# Patient Record
(demographics unavailable — no encounter records)

---

## 2024-10-15 NOTE — ASSESSMENT
[FreeTextEntry1] : Young woman in good health. Check labs when fasting. Flu shot next wk will check on Tdap, can return for RN visit if needed.

## 2024-10-15 NOTE — HISTORY OF PRESENT ILLNESS
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 Monog in hetero relationship.

## 2024-10-15 NOTE — HEALTH RISK ASSESSMENT
[Yes] : Yes [Never] : Never [HIV Test offered] : HIV Test offered [Hepatitis C test offered] : Hepatitis C test offered [Good] : ~his/her~  mood as  good [de-identified] : occas [de-identified] : a lot [de-identified] : no longer pescetarian, now eating chkn,, turkey etc [Change in mental status noted] : No change in mental status noted [Language] : denies difficulty with language [None] : None [With Family] : lives with family [Student] : student

## 2024-11-01 NOTE — END OF VISIT
[] : Resident Azelaic Acid Counseling: Patient counseled that medicine may cause skin irritation and to avoid applying near the eyes.  In the event of skin irritation, the patient was advised to reduce the amount of the drug applied or use it less frequently.   The patient verbalized understanding of the proper use and possible adverse effects of azelaic acid.  All of the patient's questions and concerns were addressed.

## 2024-11-08 NOTE — HISTORY OF PRESENT ILLNESS
[FreeTextEntry1] : fu: spots [de-identified] : Patient is a 26yo female presenting for f/u:   1. spots on the body  Also requests FBSE today. No spots concerning Maternal hx of melanoma, paternal hx of melanoma - both managed with excisions only

## 2024-11-08 NOTE — HISTORY OF PRESENT ILLNESS
[FreeTextEntry1] : fu: spots [de-identified] : Patient is a 28yo female presenting for f/u:   1. spots on the body  Also requests FBSE today. No spots concerning Maternal hx of melanoma, paternal hx of melanoma - both managed with excisions only

## 2024-11-08 NOTE — PHYSICAL EXAM
[Full Body Skin Exam Performed] : performed [Alert] : alert [Oriented x 3] : ~L oriented x 3 [FreeTextEntry3] : cannot examine nails due to nail polish  brown macules with regular borders of trunk and extremities  tan papules with regular borders of trunk and extremities  few acneiform spots on buttocks

## 2024-11-08 NOTE — ASSESSMENT
[FreeTextEntry1] :  1. Full body skin check for skin cancer screening  - no concerning lesions for skin cancer today  - I have counseled the patient on the importance of sun protection and monthly self skin exams at home. We have discussed proper sun protection habits and techniques, monthly self-skin exams. I have asked the patient to notify me of any new or changing lesions.   2. Multiple benign melanocytic nevi - ABCDE of melanoma reviewed - reassurance provided regarding nevi - monthly self-skin checks advised  3. Folliculitis on buttocks - chronic, not at treatment goal We have discussed the nature and course of this condition. I have discussed the goals of therapy with the patient. We have discussed multiple therapeutic options, their risk and benefits and expectations from treatment. - clindamycin lotion and pan oxyl wash, rx sent  RTC 1 year or PRN